# Patient Record
Sex: MALE | Race: BLACK OR AFRICAN AMERICAN | URBAN - METROPOLITAN AREA
[De-identification: names, ages, dates, MRNs, and addresses within clinical notes are randomized per-mention and may not be internally consistent; named-entity substitution may affect disease eponyms.]

---

## 2023-02-07 ENCOUNTER — WEB ENCOUNTER (OUTPATIENT)
Dept: URBAN - METROPOLITAN AREA CLINIC 115 | Facility: CLINIC | Age: 53
End: 2023-02-07

## 2023-02-07 ENCOUNTER — OFFICE VISIT (OUTPATIENT)
Dept: URBAN - METROPOLITAN AREA CLINIC 115 | Facility: CLINIC | Age: 53
End: 2023-02-07
Payer: COMMERCIAL

## 2023-02-07 ENCOUNTER — DASHBOARD ENCOUNTERS (OUTPATIENT)
Age: 53
End: 2023-02-07

## 2023-02-07 ENCOUNTER — LAB OUTSIDE AN ENCOUNTER (OUTPATIENT)
Dept: URBAN - METROPOLITAN AREA CLINIC 115 | Facility: CLINIC | Age: 53
End: 2023-02-07

## 2023-02-07 VITALS
SYSTOLIC BLOOD PRESSURE: 143 MMHG | WEIGHT: 206.4 LBS | HEART RATE: 67 BPM | DIASTOLIC BLOOD PRESSURE: 85 MMHG | BODY MASS INDEX: 32.39 KG/M2 | RESPIRATION RATE: 16 BRPM | HEIGHT: 67 IN | TEMPERATURE: 97.3 F

## 2023-02-07 DIAGNOSIS — E66.9 OBESITY (BMI 30.0-34.9): ICD-10-CM

## 2023-02-07 DIAGNOSIS — K62.5 RECTAL BLEEDING: ICD-10-CM

## 2023-02-07 DIAGNOSIS — K21.9 GASTROESOPHAGEAL REFLUX DISEASE, UNSPECIFIED WHETHER ESOPHAGITIS PRESENT: ICD-10-CM

## 2023-02-07 PROBLEM — 443371000124107: Status: ACTIVE | Noted: 2023-02-07

## 2023-02-07 PROCEDURE — 99202 OFFICE O/P NEW SF 15 MIN: CPT | Performed by: INTERNAL MEDICINE

## 2023-02-07 NOTE — HPI-TODAY'S VISIT:
53 y/o black man from [facility] with obesity that came for eval due to intermittent GERD,no related to exertion, he also refer rectal bleeding most recently.Never had a colonoscopy before.No smoke.No family hx of CRC.No anemia.Denies hx of heart disease,he play soccer weekly.

## 2023-02-08 PROBLEM — 235595009: Status: ACTIVE | Noted: 2023-02-07

## 2023-02-08 PROBLEM — 12063002: Status: ACTIVE | Noted: 2023-02-07

## 2023-02-15 ENCOUNTER — OFFICE VISIT (OUTPATIENT)
Dept: URBAN - METROPOLITAN AREA SURGERY CENTER 13 | Facility: SURGERY CENTER | Age: 53
End: 2023-02-15
Payer: COMMERCIAL

## 2023-02-15 ENCOUNTER — TELEPHONE ENCOUNTER (OUTPATIENT)
Dept: URBAN - METROPOLITAN AREA CLINIC 82 | Facility: CLINIC | Age: 53
End: 2023-02-15

## 2023-02-15 DIAGNOSIS — K63.5 BENIGN COLON POLYP: ICD-10-CM

## 2023-02-15 DIAGNOSIS — K21.9 ACID REFLUX: ICD-10-CM

## 2023-02-15 DIAGNOSIS — K29.60 ADENOPAPILLOMATOSIS GASTRICA: ICD-10-CM

## 2023-02-15 DIAGNOSIS — K62.5 ANAL BLEEDING: ICD-10-CM

## 2023-02-15 DIAGNOSIS — R10.13 ABDOMINAL DISCOMFORT, EPIGASTRIC: ICD-10-CM

## 2023-02-15 PROCEDURE — 43239 EGD BIOPSY SINGLE/MULTIPLE: CPT | Performed by: INTERNAL MEDICINE

## 2023-02-15 PROCEDURE — 45385 COLONOSCOPY W/LESION REMOVAL: CPT | Performed by: INTERNAL MEDICINE

## 2023-02-15 PROCEDURE — G8907 PT DOC NO EVENTS ON DISCHARG: HCPCS | Performed by: INTERNAL MEDICINE

## 2023-03-14 ENCOUNTER — OFFICE VISIT (OUTPATIENT)
Dept: URBAN - METROPOLITAN AREA CLINIC 115 | Facility: CLINIC | Age: 53
End: 2023-03-14